# Patient Record
Sex: FEMALE | Race: BLACK OR AFRICAN AMERICAN
[De-identification: names, ages, dates, MRNs, and addresses within clinical notes are randomized per-mention and may not be internally consistent; named-entity substitution may affect disease eponyms.]

---

## 2020-04-30 ENCOUNTER — HOSPITAL ENCOUNTER (OUTPATIENT)
Dept: HOSPITAL 92 - BICCT | Age: 60
Discharge: HOME | End: 2020-04-30
Payer: COMMERCIAL

## 2020-04-30 DIAGNOSIS — Z12.2: Primary | ICD-10-CM

## 2020-04-30 DIAGNOSIS — Z87.891: ICD-10-CM

## 2020-04-30 PROCEDURE — G0297 LDCT FOR LUNG CA SCREEN: HCPCS

## 2020-04-30 NOTE — CT
CT LUNG CANCER SCREENING EVALUATION WITHOUT IV CONTRAST:

4/30/20

 

INDICATION:

60-year-old female with history of tobacco abuse; patient has 40+ pack years smoking history with two
 packs per day. No current problems. 

 

COMPARISON: 

No CT comparisons of the thorax are available. 

 

FINDINGS: 

Mild respiratory motion artifact slightly limits image detail. There are scattered area of centrilobu
lar and paraseptal emphysema seen in a predominant upper lobe distribution affecting both lungs. Smal
l sub 4 mm pulmonary nodule is seen within the right upper lobe, adjacent to the right minor fissure.
 Areas of mild scarring versus subsegmental volume loss within the right middle lobe. No suspicious n
odule is seen within the left lung. 

 

There are calcified lymph nodes within the right hilar region. No definite enlarged lymph node is see
n within the mediastinum. Visualized adrenal glands appear within normal limits. There are scattered 
degenerative and osteoarthritic change.

 

IMPRESSION: 

Lung RADS category 2 - benign. Recommend low dose lung cancer screening CT in one year. 

 

Category S: Moderate emphysema. 

 

 

 

POS: [facility]

## 2021-06-03 ENCOUNTER — HOSPITAL ENCOUNTER (OUTPATIENT)
Dept: HOSPITAL 92 - BICCT | Age: 61
Discharge: HOME | End: 2021-06-03
Attending: FAMILY MEDICINE
Payer: COMMERCIAL

## 2021-06-03 DIAGNOSIS — R91.8: ICD-10-CM

## 2021-06-03 DIAGNOSIS — Z12.2: Primary | ICD-10-CM

## 2021-06-03 DIAGNOSIS — F17.210: ICD-10-CM

## 2021-06-03 PROCEDURE — 71271 CT THORAX LUNG CANCER SCR C-: CPT
